# Patient Record
Sex: MALE | Race: WHITE | NOT HISPANIC OR LATINO | RURAL
[De-identification: names, ages, dates, MRNs, and addresses within clinical notes are randomized per-mention and may not be internally consistent; named-entity substitution may affect disease eponyms.]

---

## 2019-08-01 ENCOUNTER — APPOINTMENT (RX ONLY)
Dept: RURAL CLINIC 4 | Facility: CLINIC | Age: 59
Setting detail: DERMATOLOGY
End: 2019-08-01

## 2019-08-01 DIAGNOSIS — L82.1 OTHER SEBORRHEIC KERATOSIS: ICD-10-CM

## 2019-08-01 DIAGNOSIS — L57.0 ACTINIC KERATOSIS: ICD-10-CM

## 2019-08-01 DIAGNOSIS — D22 MELANOCYTIC NEVI: ICD-10-CM

## 2019-08-01 PROBLEM — D22.39 MELANOCYTIC NEVI OF OTHER PARTS OF FACE: Status: ACTIVE | Noted: 2019-08-01

## 2019-08-01 PROCEDURE — 99202 OFFICE O/P NEW SF 15 MIN: CPT | Mod: 25

## 2019-08-01 PROCEDURE — ? COUNSELING

## 2019-08-01 PROCEDURE — 17003 DESTRUCT PREMALG LES 2-14: CPT

## 2019-08-01 PROCEDURE — 17000 DESTRUCT PREMALG LESION: CPT

## 2019-08-01 PROCEDURE — ? LIQUID NITROGEN

## 2019-08-01 ASSESSMENT — LOCATION SIMPLE DESCRIPTION DERM
LOCATION SIMPLE: NOSE
LOCATION SIMPLE: RIGHT UPPER ARM
LOCATION SIMPLE: RIGHT FOREHEAD

## 2019-08-01 ASSESSMENT — LOCATION ZONE DERM
LOCATION ZONE: ARM
LOCATION ZONE: NOSE
LOCATION ZONE: FACE

## 2019-08-01 ASSESSMENT — LOCATION DETAILED DESCRIPTION DERM
LOCATION DETAILED: LEFT NASAL DORSUM
LOCATION DETAILED: RIGHT DISTAL POSTERIOR UPPER ARM
LOCATION DETAILED: RIGHT SUPERIOR FOREHEAD
LOCATION DETAILED: NASAL DORSUM

## 2019-08-01 NOTE — PROCEDURE: LIQUID NITROGEN
Number Of Freeze-Thaw Cycles: 2 freeze-thaw cycles
Duration Of Freeze Thaw-Cycle (Seconds): 20
Aperture Size (Optional): C
Render Note In Bullet Format When Appropriate: No
Detail Level: Detailed

## 2019-08-01 NOTE — HPI: SKIN LESION
What Type Of Note Output Would You Prefer (Optional)?: Standard Output
How Severe Is Your Skin Lesion?: moderate
Has Your Skin Lesion Been Treated?: not been treated
Is This A New Presentation, Or A Follow-Up?: Skin Lesion
Additional History: Since making the appt the lesion has resolved.

## 2023-01-24 ENCOUNTER — APPOINTMENT (RX ONLY)
Dept: RURAL CLINIC 5 | Facility: CLINIC | Age: 63
Setting detail: DERMATOLOGY
End: 2023-01-24

## 2023-01-24 DIAGNOSIS — D22 MELANOCYTIC NEVI: ICD-10-CM

## 2023-01-24 DIAGNOSIS — L81.4 OTHER MELANIN HYPERPIGMENTATION: ICD-10-CM

## 2023-01-24 DIAGNOSIS — L57.0 ACTINIC KERATOSIS: ICD-10-CM

## 2023-01-24 PROBLEM — D22.39 MELANOCYTIC NEVI OF OTHER PARTS OF FACE: Status: ACTIVE | Noted: 2023-01-24

## 2023-01-24 PROCEDURE — ? LIQUID NITROGEN

## 2023-01-24 PROCEDURE — 17000 DESTRUCT PREMALG LESION: CPT

## 2023-01-24 PROCEDURE — ? COUNSELING

## 2023-01-24 PROCEDURE — 99203 OFFICE O/P NEW LOW 30 MIN: CPT | Mod: 25

## 2023-01-24 ASSESSMENT — LOCATION DETAILED DESCRIPTION DERM
LOCATION DETAILED: RIGHT SUPERIOR FOREHEAD
LOCATION DETAILED: RIGHT FOREHEAD
LOCATION DETAILED: LEFT SUPERIOR FOREHEAD

## 2023-01-24 ASSESSMENT — LOCATION ZONE DERM: LOCATION ZONE: FACE

## 2023-01-24 ASSESSMENT — LOCATION SIMPLE DESCRIPTION DERM
LOCATION SIMPLE: RIGHT FOREHEAD
LOCATION SIMPLE: LEFT FOREHEAD

## 2023-01-24 NOTE — PROCEDURE: LIQUID NITROGEN
Number Of Freeze-Thaw Cycles: 2 freeze-thaw cycles
Consent: The patient's consent was obtained including but not limited to risks of crusting, scabbing, blistering, scarring, darker or lighter pigmentary change, recurrence, incomplete removal and infection.
Render Post-Care Instructions In Note?: no
Show Aperture Variable?: Yes
Application Tool (Optional): Cry-AC
Aperture Size (Optional): C
Duration Of Freeze Thaw-Cycle (Seconds): 20
Detail Level: Detailed
Post-Care Instructions: I reviewed with the patient in detail post-care instructions. Patient is to wear sunprotection, and avoid picking at any of the treated lesions. Pt may apply Vaseline to crusted or scabbing areas.